# Patient Record
Sex: FEMALE | Race: WHITE | NOT HISPANIC OR LATINO | Employment: FULL TIME | URBAN - METROPOLITAN AREA
[De-identification: names, ages, dates, MRNs, and addresses within clinical notes are randomized per-mention and may not be internally consistent; named-entity substitution may affect disease eponyms.]

---

## 2018-08-14 RX ORDER — SODIUM, POTASSIUM,MAG SULFATES 17.5-3.13G
SOLUTION, RECONSTITUTED, ORAL ORAL
Qty: 1 BOTTLE | Refills: 0 | Status: SHIPPED | OUTPATIENT
Start: 2018-08-14

## 2018-08-14 RX ORDER — PROMETHAZINE HYDROCHLORIDE 25 MG/1
25 TABLET ORAL EVERY 6 HOURS PRN
Qty: 6 TABLET | Refills: 0 | Status: SHIPPED | OUTPATIENT
Start: 2018-08-14

## 2018-08-14 NOTE — TELEPHONE ENCOUNTER
----- Message from Tammy Johnson sent at 8/14/2018 10:28 AM CDT -----  Contact: self  [facility]

## 2018-08-29 ENCOUNTER — TELEPHONE (OUTPATIENT)
Dept: FAMILY MEDICINE | Facility: CLINIC | Age: 68
End: 2018-08-29

## 2018-08-29 NOTE — TELEPHONE ENCOUNTER
----- Message from Nick Neal sent at 8/29/2018  3:47 PM CDT -----  Patient's insurance termed; no active coverage to have colonoscopy done at this time. I have tried reaching out to patient..but no answer. Patient is not cleared at this time. Can someone from doctors office reach out to patient? Nick GREEN

## 2018-09-05 NOTE — TELEPHONE ENCOUNTER
I have tried calling the patient multiple times and sending her a letter stating the issue and to call me so we can get this matter fixed.  She has never called me back as of today

## 2018-09-10 ENCOUNTER — OUTSIDE PLACE OF SERVICE (OUTPATIENT)
Dept: FAMILY MEDICINE | Facility: CLINIC | Age: 68
End: 2018-09-10

## 2018-09-10 ENCOUNTER — DOCUMENTATION ONLY (OUTPATIENT)
Dept: FAMILY MEDICINE | Facility: CLINIC | Age: 68
End: 2018-09-10

## 2018-09-10 NOTE — OP NOTE
COLONOSCOPY NOTE  9/10/2018  7:04 AM    Mariangel Buckley  [address] [place] [id number]  YOB: 1950  [phone] (home)     A colonoscopy was performed on this patient at [facility] in [place], [place].  PCP: Manny Garrett MD    Indication: screening for colon cancer.  Last Colonoscopy:10 years.  Informed signed consent was obtained from the patient and the risks and the benefits were discussed.  Alternative evaluation methods were discussed.   After this, the patient was laid in the left lateral decubitus position and sedation was obtained using MAC anesthesia.    A rectal exam was performed. Findings: No masses or hemorrhoids were noted.     The colonoscope was then introduced into the colon and was advanced all of the way to the cecum with ease. The ileocecal valve and the appendiceal area was identified.    Insertion Time:4:54 minutes.  Withdrawal Time:7:45 minutes.    Findings:  Anus: Normal on retroflexion.  Rectum: Normal.  Sigmoid Colon: Normal.  Descending Colon: Normal.  Transverse Colon: Normal.  Ascending Colon: she had a 1 mm sessile polyp that was removed via hot snare polypectomy.  Cecum: Normal.  IC Valve:Normal    The patient tolerated the procedure well.  The patient's prep was excellent.    Impression:  1. Colon polyp    Plan:  Repeat the colonoscopy in 5 years for screening purposes if the polyp was adenomatous.  Recheck in 10 years if it is not.  Send a copy of this note to the referring physician.